# Patient Record
Sex: FEMALE | Race: WHITE | URBAN - METROPOLITAN AREA
[De-identification: names, ages, dates, MRNs, and addresses within clinical notes are randomized per-mention and may not be internally consistent; named-entity substitution may affect disease eponyms.]

---

## 2017-07-31 ENCOUNTER — IMPORTED ENCOUNTER (OUTPATIENT)
Dept: URBAN - METROPOLITAN AREA CLINIC 50 | Facility: CLINIC | Age: 82
End: 2017-07-31

## 2017-08-07 ENCOUNTER — IMPORTED ENCOUNTER (OUTPATIENT)
Dept: URBAN - METROPOLITAN AREA CLINIC 50 | Facility: CLINIC | Age: 82
End: 2017-08-07

## 2018-10-15 ENCOUNTER — IMPORTED ENCOUNTER (OUTPATIENT)
Dept: URBAN - METROPOLITAN AREA CLINIC 50 | Facility: CLINIC | Age: 83
End: 2018-10-15

## 2019-10-21 ENCOUNTER — IMPORTED ENCOUNTER (OUTPATIENT)
Dept: URBAN - METROPOLITAN AREA CLINIC 50 | Facility: CLINIC | Age: 84
End: 2019-10-21

## 2020-10-26 ENCOUNTER — IMPORTED ENCOUNTER (OUTPATIENT)
Dept: URBAN - METROPOLITAN AREA CLINIC 50 | Facility: CLINIC | Age: 85
End: 2020-10-26

## 2020-10-26 NOTE — PATIENT DISCUSSION
"""No evidence of disease. Reassured patient that intraocular pressures (IOPs) are at target levels ""

## 2021-04-17 ASSESSMENT — VISUAL ACUITY
OS_BAT: 20/40
OS_CC: J1+
OD_CC: 20/20-1
OD_CC: 20/20-1
OD_BAT: 20/25
OD_OTHER: 20/25. 20/60.
OS_CC: 20/25
OS_OTHER: 20/40. 20/60.
OS_CC: J1+@ 16 IN
OD_BAT: 20/20-
OD_PH: @ 16 IN
OD_CC: 20/25
OS_CC: 20/20-2
OD_BAT: 20/25
OD_CC: J1+
OS_OTHER: 20/40. 20/100.
OS_CC: J1+
OD_CC: J1+@ 16 IN
OD_OTHER: 20/20-. 20/40.
OS_OTHER: 20/40. 20/80.
OD_CC: 20/20
OS_PH: @ 16 IN
OD_CC: J1+
OS_CC: 20/20
OD_CC: J1+@ 14 IN
OS_BAT: 20/40
OS_BAT: 20/40
OS_CC: 20/20
OS_CC: J1+@ 14 IN

## 2021-04-17 ASSESSMENT — TONOMETRY
OS_IOP_MMHG: 17
OS_IOP_MMHG: 14
OS_IOP_MMHG: 13
OS_IOP_MMHG: 16
OD_IOP_MMHG: 16
OS_IOP_MMHG: 14
OD_IOP_MMHG: 12
OD_IOP_MMHG: 16
OD_IOP_MMHG: 12
OS_IOP_MMHG: 16
OD_IOP_MMHG: 16
OD_IOP_MMHG: 13
OD_IOP_MMHG: 12
OD_IOP_MMHG: 9
OS_IOP_MMHG: 13
OS_IOP_MMHG: 11

## 2021-04-17 ASSESSMENT — PACHYMETRY
OS_CT_UM: 610
OD_CT_UM: 617
OS_CT_UM: 610
OD_CT_UM: 617
OD_CT_UM: 617
OS_CT_UM: 610
OS_CT_UM: 610
OD_CT_UM: 617
OD_CT_UM: 617
OS_CT_UM: 610

## 2021-10-28 ENCOUNTER — PREPPED CHART (OUTPATIENT)
Dept: URBAN - METROPOLITAN AREA CLINIC 52 | Facility: CLINIC | Age: 86
End: 2021-10-28